# Patient Record
Sex: FEMALE | Race: WHITE | NOT HISPANIC OR LATINO | Employment: FULL TIME | URBAN - METROPOLITAN AREA
[De-identification: names, ages, dates, MRNs, and addresses within clinical notes are randomized per-mention and may not be internally consistent; named-entity substitution may affect disease eponyms.]

---
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
## 2020-06-24 ENCOUNTER — HOSPITAL ENCOUNTER (EMERGENCY)
Facility: HOSPITAL | Age: 20
Discharge: HOME | End: 2020-06-24
Attending: EMERGENCY MEDICINE
Payer: MEDICAID

## 2020-06-24 VITALS
SYSTOLIC BLOOD PRESSURE: 110 MMHG | DIASTOLIC BLOOD PRESSURE: 71 MMHG | HEART RATE: 98 BPM | RESPIRATION RATE: 18 BRPM | BODY MASS INDEX: 21.34 KG/M2 | TEMPERATURE: 100 F | OXYGEN SATURATION: 99 % | WEIGHT: 125 LBS | HEIGHT: 64 IN

## 2020-06-24 DIAGNOSIS — N12 PYELONEPHRITIS: Primary | ICD-10-CM

## 2020-06-24 LAB
ALBUMIN SERPL BCP-MCNC: 4.8 G/DL (ref 3.5–5.2)
ALP SERPL-CCNC: 50 U/L (ref 55–135)
ALT SERPL W/O P-5'-P-CCNC: 14 U/L (ref 10–44)
ANION GAP SERPL CALC-SCNC: 10 MMOL/L (ref 8–16)
AST SERPL-CCNC: 18 U/L (ref 10–40)
B-HCG UR QL: NEGATIVE
BACTERIA #/AREA URNS HPF: ABNORMAL /HPF
BASOPHILS # BLD AUTO: 0.04 K/UL (ref 0–0.2)
BASOPHILS NFR BLD: 0.3 % (ref 0–1.9)
BILIRUB SERPL-MCNC: 1 MG/DL (ref 0.1–1)
BILIRUB UR QL STRIP: NEGATIVE
BILIRUB UR QL STRIP: NEGATIVE
BUN SERPL-MCNC: 9 MG/DL (ref 6–20)
CALCIUM SERPL-MCNC: 9.8 MG/DL (ref 8.7–10.5)
CHLORIDE SERPL-SCNC: 108 MMOL/L (ref 95–110)
CLARITY UR: CLEAR
CLARITY UR: CLEAR
CO2 SERPL-SCNC: 21 MMOL/L (ref 23–29)
COLOR UR: YELLOW
COLOR UR: YELLOW
CREAT SERPL-MCNC: 0.9 MG/DL (ref 0.5–1.4)
CTP QC/QA: YES
D DIMER PPP IA.FEU-MCNC: 0.45 UG/ML FEU
DIFFERENTIAL METHOD: ABNORMAL
EOSINOPHIL # BLD AUTO: 0 K/UL (ref 0–0.5)
EOSINOPHIL NFR BLD: 0.1 % (ref 0–8)
ERYTHROCYTE [DISTWIDTH] IN BLOOD BY AUTOMATED COUNT: 12.2 % (ref 11.5–14.5)
EST. GFR  (AFRICAN AMERICAN): >60 ML/MIN/1.73 M^2
EST. GFR  (NON AFRICAN AMERICAN): >60 ML/MIN/1.73 M^2
GLUCOSE SERPL-MCNC: 95 MG/DL (ref 70–110)
GLUCOSE UR QL STRIP: NEGATIVE
GLUCOSE UR QL STRIP: NEGATIVE
HCT VFR BLD AUTO: 44.1 % (ref 37–48.5)
HGB BLD-MCNC: 14.5 G/DL (ref 12–16)
HGB UR QL STRIP: ABNORMAL
HGB UR QL STRIP: ABNORMAL
HYALINE CASTS #/AREA URNS LPF: 22 /LPF
IMM GRANULOCYTES # BLD AUTO: 0.08 K/UL (ref 0–0.04)
IMM GRANULOCYTES NFR BLD AUTO: 0.5 % (ref 0–0.5)
KETONES UR QL STRIP: NEGATIVE
KETONES UR QL STRIP: NEGATIVE
LEUKOCYTE ESTERASE UR QL STRIP: ABNORMAL
LEUKOCYTE ESTERASE UR QL STRIP: ABNORMAL
LYMPHOCYTES # BLD AUTO: 0.9 K/UL (ref 1–4.8)
LYMPHOCYTES NFR BLD: 6.1 % (ref 18–48)
MCH RBC QN AUTO: 28.1 PG (ref 27–31)
MCHC RBC AUTO-ENTMCNC: 32.9 G/DL (ref 32–36)
MCV RBC AUTO: 86 FL (ref 82–98)
MICROSCOPIC COMMENT: ABNORMAL
MONOCYTES # BLD AUTO: 1.1 K/UL (ref 0.3–1)
MONOCYTES NFR BLD: 7 % (ref 4–15)
NEUTROPHILS # BLD AUTO: 13 K/UL (ref 1.8–7.7)
NEUTROPHILS NFR BLD: 86 % (ref 38–73)
NITRITE UR QL STRIP: NEGATIVE
NITRITE UR QL STRIP: NEGATIVE
NRBC BLD-RTO: 0 /100 WBC
PH UR STRIP: 6 [PH] (ref 5–8)
PH UR STRIP: 6 [PH] (ref 5–8)
PLATELET # BLD AUTO: 146 K/UL (ref 150–350)
PMV BLD AUTO: 13.1 FL (ref 9.2–12.9)
POTASSIUM SERPL-SCNC: 3.7 MMOL/L (ref 3.5–5.1)
PROT SERPL-MCNC: 8.4 G/DL (ref 6–8.4)
PROT UR QL STRIP: NEGATIVE
PROT UR QL STRIP: NEGATIVE
RBC # BLD AUTO: 5.16 M/UL (ref 4–5.4)
RBC #/AREA URNS HPF: 5 /HPF (ref 0–4)
SODIUM SERPL-SCNC: 139 MMOL/L (ref 136–145)
SP GR UR STRIP: 1.01 (ref 1–1.03)
SP GR UR STRIP: 1.01 (ref 1–1.03)
SQUAMOUS #/AREA URNS HPF: 0 /HPF
URN SPEC COLLECT METH UR: ABNORMAL
URN SPEC COLLECT METH UR: ABNORMAL
UROBILINOGEN UR STRIP-ACNC: NEGATIVE EU/DL
UROBILINOGEN UR STRIP-ACNC: NEGATIVE EU/DL
WBC # BLD AUTO: 15.15 K/UL (ref 3.9–12.7)
WBC #/AREA URNS HPF: 61 /HPF (ref 0–5)

## 2020-06-24 PROCEDURE — 81001 URINALYSIS AUTO W/SCOPE: CPT

## 2020-06-24 PROCEDURE — 81025 URINE PREGNANCY TEST: CPT | Performed by: EMERGENCY MEDICINE

## 2020-06-24 PROCEDURE — 96375 TX/PRO/DX INJ NEW DRUG ADDON: CPT

## 2020-06-24 PROCEDURE — 96365 THER/PROPH/DIAG IV INF INIT: CPT

## 2020-06-24 PROCEDURE — 96366 THER/PROPH/DIAG IV INF ADDON: CPT

## 2020-06-24 PROCEDURE — 63600175 PHARM REV CODE 636 W HCPCS: Performed by: EMERGENCY MEDICINE

## 2020-06-24 PROCEDURE — 87086 URINE CULTURE/COLONY COUNT: CPT

## 2020-06-24 PROCEDURE — 25000003 PHARM REV CODE 250: Performed by: EMERGENCY MEDICINE

## 2020-06-24 PROCEDURE — 85025 COMPLETE CBC W/AUTO DIFF WBC: CPT

## 2020-06-24 PROCEDURE — 80053 COMPREHEN METABOLIC PANEL: CPT

## 2020-06-24 PROCEDURE — 85379 FIBRIN DEGRADATION QUANT: CPT

## 2020-06-24 PROCEDURE — 36415 COLL VENOUS BLD VENIPUNCTURE: CPT

## 2020-06-24 PROCEDURE — 99284 EMERGENCY DEPT VISIT MOD MDM: CPT | Mod: 25

## 2020-06-24 RX ORDER — ONDANSETRON 4 MG/1
4 TABLET, FILM COATED ORAL EVERY 6 HOURS
Qty: 12 TABLET | Refills: 0 | Status: SHIPPED | OUTPATIENT
Start: 2020-06-24

## 2020-06-24 RX ORDER — AMOXICILLIN AND CLAVULANATE POTASSIUM 875; 125 MG/1; MG/1
1 TABLET, FILM COATED ORAL 2 TIMES DAILY
Qty: 20 TABLET | Refills: 0 | Status: SHIPPED | OUTPATIENT
Start: 2020-06-24

## 2020-06-24 RX ORDER — KETOROLAC TROMETHAMINE 30 MG/ML
15 INJECTION, SOLUTION INTRAMUSCULAR; INTRAVENOUS
Status: COMPLETED | OUTPATIENT
Start: 2020-06-24 | End: 2020-06-24

## 2020-06-24 RX ADMIN — KETOROLAC TROMETHAMINE 15 MG: 30 INJECTION, SOLUTION INTRAMUSCULAR at 05:06

## 2020-06-24 RX ADMIN — SODIUM CHLORIDE 1000 ML: 0.9 INJECTION, SOLUTION INTRAVENOUS at 05:06

## 2020-06-24 RX ADMIN — CEFTRIAXONE 1 G: 1 INJECTION, SOLUTION INTRAVENOUS at 05:06

## 2020-06-24 NOTE — DISCHARGE INSTRUCTIONS
Take medications as directed  Follow up as directed  Return if he develops fever, vomiting, or you condition becomes worse

## 2020-06-24 NOTE — ED PROVIDER NOTES
Encounter Date: 6/24/2020       History     Chief Complaint   Patient presents with    Flank Pain     19-year-old well-appearing female presents to the emergency department with complaint of right flank pain she states the pain started approximately 1 week ago would come and go she reports that has been persistent for the last 2 days she was seen at a local urgent care prior to coming to the emergency department she was given 8 mg of Zofran and had 1 episode of vomiting after the Zofran patient denies any nausea or vomiting prior to the medication denies any associated fevers.        Review of patient's allergies indicates:  No Known Allergies  Past Medical History:   Diagnosis Date    Anemia      No past surgical history on file.  Family History   Problem Relation Age of Onset    Diabetes Mother     Hypertension Mother     No Known Problems Father      Social History     Tobacco Use    Smoking status: Never Smoker    Smokeless tobacco: Never Used   Substance Use Topics    Alcohol use: No     Frequency: Never    Drug use: Not on file     Review of Systems   Constitutional: Negative for fever.   HENT: Negative.    Respiratory: Negative.    Cardiovascular: Negative.    Gastrointestinal: Negative.    Genitourinary: Positive for flank pain. Negative for decreased urine volume, difficulty urinating, dyspareunia, dysuria, enuresis, frequency, genital sores, hematuria, menstrual problem, pelvic pain, urgency, vaginal bleeding and vaginal discharge.   Skin: Negative.    Neurological: Negative.    Hematological: Negative.    Psychiatric/Behavioral: Negative.    All other systems reviewed and are negative.      Physical Exam     Initial Vitals [06/24/20 1502]   BP Pulse Resp Temp SpO2   121/76 85 16 98.6 °F (37 °C) 99 %      MAP       --         Physical Exam    Vitals reviewed.  Constitutional: She appears well-developed and well-nourished.   HENT:   Head: Normocephalic.   Right Ear: External ear normal.   Left Ear:  External ear normal.   Nose: Nose normal.   Mouth/Throat: Oropharynx is clear and moist.   Eyes: Pupils are equal, round, and reactive to light.   Neck: Normal range of motion.   Cardiovascular: Normal rate, regular rhythm, normal heart sounds and intact distal pulses.   Pulmonary/Chest: Breath sounds normal.   Abdominal: Soft. Bowel sounds are normal.   Musculoskeletal: Normal range of motion.   Neurological: She is alert and oriented to person, place, and time. She has normal strength.   Skin: Skin is warm and dry.   Psychiatric: She has a normal mood and affect.         ED Course   Procedures  Labs Reviewed   URINALYSIS, REFLEX TO URINE CULTURE - Abnormal; Notable for the following components:       Result Value    Occult Blood UA 1+ (*)     Leukocytes, UA 2+ (*)     All other components within normal limits    Narrative:     Preferred Collection Type->Urine, Clean Catch  Specimen Source->Urine   CBC W/ AUTO DIFFERENTIAL - Abnormal; Notable for the following components:    WBC 15.15 (*)     Platelets 146 (*)     MPV 13.1 (*)     Gran # (ANC) 13.0 (*)     Immature Grans (Abs) 0.08 (*)     Lymph # 0.9 (*)     Mono # 1.1 (*)     Gran% 86.0 (*)     Lymph% 6.1 (*)     All other components within normal limits   COMPREHENSIVE METABOLIC PANEL - Abnormal; Notable for the following components:    CO2 21 (*)     Alkaline Phosphatase 50 (*)     All other components within normal limits   URINALYSIS, REFLEX TO URINE CULTURE - Abnormal; Notable for the following components:    Occult Blood UA 1+ (*)     Leukocytes, UA 2+ (*)     All other components within normal limits    Narrative:     Preferred Collection Type->Urine, Clean Catch  Specimen Source->Urine   URINALYSIS MICROSCOPIC - Abnormal; Notable for the following components:    RBC, UA 5 (*)     WBC, UA 61 (*)     Bacteria Many (*)     Hyaline Casts, UA 22 (*)     All other components within normal limits    Narrative:     Preferred Collection Type->Urine, Clean  Catch  Specimen Source->Urine   CULTURE, URINE   D DIMER, QUANTITATIVE   D DIMER, QUANTITATIVE   POCT URINE PREGNANCY          Imaging Results          CT Renal Stone Study ABD Pelvis WO (Final result)  Result time 06/24/20 15:44:35    Final result by Jay Lorenzo MD (06/24/20 15:44:35)                 Narrative:    CMS MANDATED QUALITY DATA - CT RADIATION  436    All CT scans at this facility utilize dose modulation, iterative  reconstruction, and/or weight based dosing when appropriate to reduce  radiation dose to as low as reasonably achievable.    CLINICAL HISTORY:  19 years (2000) Female Flank pain, kidney stone suspected RT flank  pain    TECHNIQUE:  CT RENAL STONE STUDY ABD PELVIS WO. 225 images obtained. Axial CT  images of the abdomen and pelvis were obtained from the dome of the  diaphragm to the proximal thigh.    CONTRAST:  No IV contrast was administered    COMPARISON:  None available.    FINDINGS:  Evaluation of the solid organs and vasculature is suboptimal without  contrast.    Lower Thorax:  The lung bases are clear. The heart is normal in size.    CT Abdomen:  Liver: The liver is normal in size and imaging appearance.  Gallbladder: The gallbladder is within normal limits.  Biliary Tree: No intra or extrahepatic ductal dilation.  Spleen: Within normal limits.  Pancreas: The pancreas is normal.  Adrenal Glands: The adrenal glands appear within normal limits.  Kidneys: No hydronephrosis, hydroureter or radiopaque renal/collecting  system stone. There is slight asymmetric perinephric fat stranding  around the right kidney.  Vasculature: The aorta is normal in course and caliber.  Lymph nodes: No abdominal lymphadenopathy is seen.  Intraperitoneal structures: There is no ascites.  Bowel: The partially filled bowel is within normal limits with a  nonobstructive bowel gas pattern. The appendix is normal (image 120)  Abdominal wall: The abdominal wall and musculature are  normal.  Musculoskeletal: No acute osseous abnormality is identified .    CT Pelvis:  Bladder: The urinary bladder is within normal limits.  Reproductive Organs: The uterus and ovaries are unremarkable.  Pelvic Lymph nodes: No pelvic lymphadenopathy or pelvic mass is  identified.    IMPRESSION:  No hydronephrosis, hydroureter or radiopaque renal/collecting system  stone. There is slight asymmetric perinephric fat stranding around the  right kidney, a nonspecific finding which may suggest a recently  passed stone, infection or physiologic variation.                    .    Electronically Signed by JACKI Carrington on 6/24/2020 3:50 PM                               Medical Decision Making:   Initial Assessment:   Right flank pain  Differential Diagnosis:   Ureterolithiasis, pyelonephritis, UTI, PE, cholecystitis  ED Management:  19-year-old female presents to the emergency department with complaint of right flank pain radiates around to her side.  She had 1 episode of vomiting prior to arrival she is currently eating a denies nausea.  Patient evaluated for presence of ureterolithiasis CT imaging reveals fat stranding consistent with either a previous passed stone or pyelonephritis.  Patient does have infected urine and a elevated white count consistent with pyelonephritis she was given IV Rocephin.  She will be discharged home with Augmentin and Zofran again she is tolerating by mouth.  Patient was given detailed return precautions. Dr Wharton personally evaluated patient                    ED Course as of Jun 24 1715   Wed Jun 24, 2020   1504 C/o right flank pain off and on for one week had on episode of n/v today. Went to urgent care sent here to exclude kidney stones     [MP]   1519 D dimer, quantitative [MP]   1522   Agree with plan and disposition and management.  I evaluated the patient myself along with the mid-level provider and examined the patient and agree with plan and management.    Face to face  exam  done on this patient.  Patient has right flank pain and will evaluate for kidney stones.  Patient had 8 mg of Zofran prior to arrival.  Pain control to be done once room opens up.  Currently treatment started in the waiting room.    [UM]      ED Course User Index  [MP] ISAMAR Choe  [UM] Shahana Wharton MD                Clinical Impression:       ICD-10-CM ICD-9-CM   1. Pyelonephritis  N12 590.80                                ISAMAR Choe  06/24/20 1724

## 2020-06-26 LAB
BACTERIA UR CULT: NORMAL
BACTERIA UR CULT: NORMAL

## 2021-04-08 ENCOUNTER — LAB VISIT (OUTPATIENT)
Dept: LAB | Facility: HOSPITAL | Age: 21
End: 2021-04-08
Attending: NURSE PRACTITIONER
Payer: MEDICAID

## 2021-04-08 DIAGNOSIS — E16.2 HYPOGLYCEMIA, UNSPECIFIED: ICD-10-CM

## 2021-04-08 DIAGNOSIS — E55.9 AVITAMINOSIS D: ICD-10-CM

## 2021-04-08 DIAGNOSIS — R53.83 FATIGUE: Primary | ICD-10-CM

## 2021-04-08 LAB
25(OH)D3+25(OH)D2 SERPL-MCNC: 22 NG/ML (ref 30–96)
ALBUMIN SERPL BCP-MCNC: 4.5 G/DL (ref 3.5–5.2)
ALP SERPL-CCNC: 49 U/L (ref 55–135)
ALT SERPL W/O P-5'-P-CCNC: 23 U/L (ref 10–44)
ANION GAP SERPL CALC-SCNC: 9 MMOL/L (ref 8–16)
AST SERPL-CCNC: 21 U/L (ref 10–40)
B-OH-BUTYR BLD STRIP-SCNC: 0.2 MMOL/L (ref 0–0.5)
BASOPHILS # BLD AUTO: 0.05 K/UL (ref 0–0.2)
BASOPHILS NFR BLD: 0.8 % (ref 0–1.9)
BILIRUB SERPL-MCNC: 0.9 MG/DL (ref 0.1–1)
BUN SERPL-MCNC: 10 MG/DL (ref 6–20)
CALCIUM SERPL-MCNC: 9.6 MG/DL (ref 8.7–10.5)
CHLORIDE SERPL-SCNC: 107 MMOL/L (ref 95–110)
CO2 SERPL-SCNC: 23 MMOL/L (ref 23–29)
CORTIS SERPL-MCNC: 10.8 UG/DL
CREAT SERPL-MCNC: 0.8 MG/DL (ref 0.5–1.4)
DIFFERENTIAL METHOD: ABNORMAL
EOSINOPHIL # BLD AUTO: 0.1 K/UL (ref 0–0.5)
EOSINOPHIL NFR BLD: 1.3 % (ref 0–8)
ERYTHROCYTE [DISTWIDTH] IN BLOOD BY AUTOMATED COUNT: 12.7 % (ref 11.5–14.5)
EST. GFR  (AFRICAN AMERICAN): >60 ML/MIN/1.73 M^2
EST. GFR  (NON AFRICAN AMERICAN): >60 ML/MIN/1.73 M^2
ESTIMATED AVG GLUCOSE: 105 MG/DL (ref 68–131)
FOLATE SERPL-MCNC: 10.1 NG/ML (ref 4–24)
GLUCOSE SERPL-MCNC: 89 MG/DL (ref 70–110)
HBA1C MFR BLD: 5.3 % (ref 4.5–6.2)
HCT VFR BLD AUTO: 43.4 % (ref 37–48.5)
HGB BLD-MCNC: 14.6 G/DL (ref 12–16)
IMM GRANULOCYTES # BLD AUTO: 0.02 K/UL (ref 0–0.04)
IMM GRANULOCYTES NFR BLD AUTO: 0.3 % (ref 0–0.5)
IRON SERPL-MCNC: 84 UG/DL (ref 30–160)
LYMPHOCYTES # BLD AUTO: 1.8 K/UL (ref 1–4.8)
LYMPHOCYTES NFR BLD: 28.2 % (ref 18–48)
MCH RBC QN AUTO: 28.2 PG (ref 27–31)
MCHC RBC AUTO-ENTMCNC: 33.6 G/DL (ref 32–36)
MCV RBC AUTO: 84 FL (ref 82–98)
MONOCYTES # BLD AUTO: 0.5 K/UL (ref 0.3–1)
MONOCYTES NFR BLD: 7.6 % (ref 4–15)
NEUTROPHILS # BLD AUTO: 3.8 K/UL (ref 1.8–7.7)
NEUTROPHILS NFR BLD: 61.8 % (ref 38–73)
NRBC BLD-RTO: 0 /100 WBC
PLATELET # BLD AUTO: 151 K/UL (ref 150–450)
PMV BLD AUTO: 13 FL (ref 9.2–12.9)
POTASSIUM SERPL-SCNC: 3.9 MMOL/L (ref 3.5–5.1)
PROT SERPL-MCNC: 7.9 G/DL (ref 6–8.4)
RBC # BLD AUTO: 5.18 M/UL (ref 4–5.4)
SATURATED IRON: 23 % (ref 20–50)
SODIUM SERPL-SCNC: 139 MMOL/L (ref 136–145)
T4 FREE SERPL-MCNC: 0.85 NG/DL (ref 0.71–1.51)
TOTAL IRON BINDING CAPACITY: 360 UG/DL (ref 250–450)
TRANSFERRIN SERPL-MCNC: 257 MG/DL (ref 200–375)
TSH SERPL DL<=0.005 MIU/L-ACNC: 2.69 UIU/ML (ref 0.34–5.6)
VIT B12 SERPL-MCNC: 240 PG/ML (ref 210–950)
WBC # BLD AUTO: 6.21 K/UL (ref 3.9–12.7)

## 2021-04-08 PROCEDURE — 82746 ASSAY OF FOLIC ACID SERUM: CPT | Performed by: NURSE PRACTITIONER

## 2021-04-08 PROCEDURE — 82533 TOTAL CORTISOL: CPT | Performed by: NURSE PRACTITIONER

## 2021-04-08 PROCEDURE — 84479 ASSAY OF THYROID (T3 OR T4): CPT | Performed by: NURSE PRACTITIONER

## 2021-04-08 PROCEDURE — 84439 ASSAY OF FREE THYROXINE: CPT | Performed by: NURSE PRACTITIONER

## 2021-04-08 PROCEDURE — 84445 ASSAY OF TSI GLOBULIN: CPT | Performed by: NURSE PRACTITIONER

## 2021-04-08 PROCEDURE — 81291 MTHFR GENE: CPT | Performed by: NURSE PRACTITIONER

## 2021-04-08 PROCEDURE — 82306 VITAMIN D 25 HYDROXY: CPT | Performed by: NURSE PRACTITIONER

## 2021-04-08 PROCEDURE — 83036 HEMOGLOBIN GLYCOSYLATED A1C: CPT | Performed by: NURSE PRACTITIONER

## 2021-04-08 PROCEDURE — 84207 ASSAY OF VITAMIN B-6: CPT | Performed by: NURSE PRACTITIONER

## 2021-04-08 PROCEDURE — 86376 MICROSOMAL ANTIBODY EACH: CPT | Performed by: NURSE PRACTITIONER

## 2021-04-08 PROCEDURE — 80053 COMPREHEN METABOLIC PANEL: CPT | Performed by: NURSE PRACTITIONER

## 2021-04-08 PROCEDURE — 84681 ASSAY OF C-PEPTIDE: CPT | Performed by: NURSE PRACTITIONER

## 2021-04-08 PROCEDURE — 83525 ASSAY OF INSULIN: CPT | Performed by: NURSE PRACTITIONER

## 2021-04-08 PROCEDURE — 82607 VITAMIN B-12: CPT | Performed by: NURSE PRACTITIONER

## 2021-04-08 PROCEDURE — 82010 KETONE BODYS QUAN: CPT | Performed by: NURSE PRACTITIONER

## 2021-04-08 PROCEDURE — 84436 ASSAY OF TOTAL THYROXINE: CPT | Mod: XB | Performed by: NURSE PRACTITIONER

## 2021-04-08 PROCEDURE — 36415 COLL VENOUS BLD VENIPUNCTURE: CPT | Performed by: NURSE PRACTITIONER

## 2021-04-08 PROCEDURE — 84443 ASSAY THYROID STIM HORMONE: CPT | Performed by: NURSE PRACTITIONER

## 2021-04-08 PROCEDURE — 85025 COMPLETE CBC W/AUTO DIFF WBC: CPT | Performed by: NURSE PRACTITIONER

## 2021-04-08 PROCEDURE — 83540 ASSAY OF IRON: CPT | Performed by: NURSE PRACTITIONER

## 2021-04-08 PROCEDURE — 82024 ASSAY OF ACTH: CPT | Performed by: NURSE PRACTITIONER

## 2021-04-08 PROCEDURE — 83090 ASSAY OF HOMOCYSTEINE: CPT | Performed by: NURSE PRACTITIONER

## 2021-04-09 LAB
C PEPTIDE SERPL-MCNC: 2.5 NG/ML (ref 1.1–4.4)
INSULIN SERPL-ACNC: 14.8 UIU/ML (ref 2.6–24.9)
T3RU NFR SERPL: 25 % (ref 24–39)
T4 SERPL-MCNC: 6.8 UG/DL (ref 4.5–12)
THYROPEROXIDASE AB SERPL-ACNC: <9 IU/ML (ref 0–34)

## 2021-04-10 LAB
ACTH PLAS-MCNC: 10.4 PG/ML (ref 7.2–63.3)
HCYS SERPL-SCNC: 13.1 UMOL/L (ref 0–14.5)
TSI SER-ACNC: <0.1 IU/L (ref 0–0.55)

## 2021-04-13 LAB — MTHFR GENE MUT ANL BLD/T: NORMAL

## 2021-04-17 LAB — VIT B6 SERPL-MCNC: 16.8 UG/L (ref 2–32.8)

## 2021-05-12 ENCOUNTER — PATIENT MESSAGE (OUTPATIENT)
Dept: RESEARCH | Facility: HOSPITAL | Age: 21
End: 2021-05-12

## 2025-04-07 NOTE — PROGRESS NOTES
ENDOCRINOLOGY NEW PATIENT VISIT: 04/08/2025    The patient location is: Personal Vehicle  The chief complaint leading to consultation is: Hypoglycemia concern    Visit type: audiovisual    Face to Face time with patient: 25 minutes  40 minutes of total time spent on the encounter, which includes face to face time and non-face to face time preparing to see the patient (eg, review of tests), Obtaining and/or reviewing separately obtained history, Documenting clinical information in the electronic or other health record, Independently interpreting results (not separately reported) and communicating results to the patient/family/caregiver, or Care coordination (not separately reported).     Each patient to whom he or she provides medical services by telemedicine is:  (1) informed of the relationship between the physician and patient and the respective role of any other health care provider with respect to management of the patient; and (2) notified that he or she may decline to receive medical services by telemedicine and may withdraw from such care at any time.      Subjective:      Patient ID: Huyen Thomas is a 24 y.o. female.    Chief Complaint:  Consult and Hypoglycemia    History of Present Illness  Huyen Thomas presents for evaluation of concerns of hypoglycemia.  Has had issues with low blood sugars for many years and had prior testing with PCP outside of [facility] records for review.  She recalls labs were not overly concerning but also that abdominal ultrasound did not show issues with her pancreas.  Has been monitoring with glucometer.  Has done some dietary adjustments to try and help.      Regarding Hypoglycemia:    - Symptoms of hypoglycemia first started:  First occurred 10 years ago with worsening overtime.    - Episode frequency: Has been about every day recently.    - Symptoms include: Nausea, shakiness, mental confusion (mild), sweaty  - Corrects with: Some sugar and then will have something with  "higher protein amount    - Pertinent factors:  No   Yes  [x]    []   Prior hypoglycemic episodes  [x]    []   Currently taking diabetes medications  []    [x]   Occur within 2-3 hours of consuming meal  [x]    []   Occur overnight (single episode overnight that she recalls)  [x]    []   Occur after prolonged fasting  [x]    []   Occur after physical exertion  []    [x]   Symptoms resolved after consuming carbohydrate    - Personal history of hepatic, renal or cardiac failure:   None  - History of adrenal insufficiency:  None  - History of gastric bypass surgery or fundoplication:  None  - Alcohol use:  None    - Family history of hypoglycemia:   Patient denies family history of hypoglycemia    - Never confirmed on serum glucose test  - Whipple triad: (1) Symptoms of hypoglycemia (2) Hypoglycemia (blood glucose level <50 mg/dL) (3) Relief of symptoms following ingestion of carbohydrate  - 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  BP Readings from Last 3 Encounters:   03/25/23 117/70   06/24/20 110/71   01/29/19 125/72     Wt Readings from Last 1 Encounters:   03/25/23 0737 69 kg (152 lb 1.9 oz)     There is no height or weight on file to calculate BMI.    Physical Exam  Constitutional:       Appearance: Normal appearance.   Neurological:      Mental Status: She is alert.   Psychiatric:         Mood and Affect: Mood normal.         Behavior: Behavior normal.        Lab Review:   Lab Results   Component Value Date    HGBA1C 5.3 04/08/2021     No results found for: "CHOL", "HDL", "LDLCALC", "TRIG", "CHOLHDL"  Lab Results   Component Value Date     04/08/2021    K 3.9 04/08/2021 "     04/08/2021    CO2 23 04/08/2021    GLU 89 04/08/2021    BUN 10 04/08/2021    CREATININE 0.8 04/08/2021    CALCIUM 9.6 04/08/2021    PROT 7.9 04/08/2021    ALBUMIN 4.5 04/08/2021    BILITOT 0.9 04/08/2021    ALKPHOS 49 (L) 04/08/2021    AST 21 04/08/2021    ALT 23 04/08/2021    ANIONGAP 9 04/08/2021    ESTGFRAFRICA >60.0 04/08/2021    EGFRNONAA >60.0 04/08/2021    TSH 2.690 04/08/2021     Vit D, 25-Hydroxy   Date Value Ref Range Status   04/08/2021 22 (L) 30 - 96 ng/mL Final     Comment:     Vitamin D deficiency.........<10 ng/mL                              Vitamin D insufficiency......10-29 ng/mL       Vitamin D sufficiency........> or equal to 30 ng/mL  Vitamin D toxicity............>100 ng/mL         Assessment and Plan     Hypoglycemia  Does not satisfy whipple's triad of documented low glucose, symptoms consistent with hypoglycemia and relief of symptoms after treatment.  Would need lab confirmed testing showing low glucose under 55.  No prior records available to review when this first was noted to be an issue in recent years when worked up in [place] as she recalls.  Reviewed options and will plan to have her self monitor glucose for now and per her request we can send a CGM to her pharmacy.  Ideally should only need to check glucose levels when symptoms presents.  Overall pattern is in the post-prandial time frame.  Will suggest dietary recs with focus on low glycemic index foods for now.  Can consider a mixed meal clinic based test in the future as well.      Plan:  - Monitor glucose overtime with glucometer (CGM as well is ok but confirm lows with finger stick checks)  - Note dietary items that are playing a role, likely high glycemic index foods  - Adjust diet to lower glycemic index and correct lows with mixture of carbs followed by protein/fat  - If persistent lows we can plan a clinic based mixed meal test  - If confirmed lows we can consider medicine such as acarbose and further imaging if  hypoglycemia is confirmed in periods of fasting to rule out insulinoma or other pathologic cause of low glucose      RTC in 6 months as needed      **Visit today included increased complexity associated with the care of the problems addressed and managing the longitudinal care of the patient due to the serious and/or complex managed problems      Jose Jeronimo DO     Disclaimer: This note has been generated using voice-recognition software. There may be typographical errors that have been missed during proof-reading.

## 2025-04-08 ENCOUNTER — OFFICE VISIT (OUTPATIENT)
Facility: CLINIC | Age: 25
End: 2025-04-08
Payer: COMMERCIAL

## 2025-04-08 DIAGNOSIS — E16.2 HYPOGLYCEMIA: Primary | ICD-10-CM

## 2025-04-08 RX ORDER — BLOOD-GLUCOSE SENSOR
1 EACH MISCELLANEOUS
Qty: 2 EACH | Refills: 11 | Status: SHIPPED | OUTPATIENT
Start: 2025-04-08 | End: 2026-04-08

## 2025-04-08 NOTE — ASSESSMENT & PLAN NOTE
Does not satisfy whipple's triad of documented low glucose, symptoms consistent with hypoglycemia and relief of symptoms after treatment.  Would need lab confirmed testing showing low glucose under 55.  No prior records available to review when this first was noted to be an issue in recent years when worked up in [place] as she recalls.  Reviewed options and will plan to have her self monitor glucose for now and per her request we can send a CGM to her pharmacy.  Ideally should only need to check glucose levels when symptoms presents.  Overall pattern is in the post-prandial time frame.  Will suggest dietary recs with focus on low glycemic index foods for now.  Can consider a mixed meal clinic based test in the future as well.      Plan:  - Monitor glucose overtime with glucometer (CGM as well is ok but confirm lows with finger stick checks)  - Note dietary items that are playing a role, likely high glycemic index foods  - Adjust diet to lower glycemic index and correct lows with mixture of carbs followed by protein/fat  - If persistent lows we can plan a clinic based mixed meal test  - If confirmed lows we can consider medicine such as acarbose and further imaging if hypoglycemia is confirmed in periods of fasting to rule out insulinoma or other pathologic cause of low glucose

## 2025-04-08 NOTE — PATIENT INSTRUCTIONS
For patients interested in tracking glucose levels, there are now over-the-counter continuous glucose monitors (CGMs) available without a prescription.     These include the Dexcom Stelo (www.stelo.com) and the Abbott Lingo (www.[email or web address].com). These devices provide ongoing glucose trend data and can be useful for individuals wanting to better understand how food, activity, and other factors affect their blood sugar levels.    Please note: CGM readings are helpful for trends but may occasionally be inaccurate. If a CGM reading seems unusually high or low, it should always be confirmed with a standard glucometer (fingerstick).      Understanding Hypoglycemia: What You Need to Know    If you've been referred to me for hypoglycemia (low blood sugar) or have concerns about occasional drops in your blood sugar, this document is for you. As an endocrinologist, I see many patients worried about low blood sugar outside of diabetes, and I'm here to provide clarity. Most of the time, there's no serious medical condition to worry about, and I'll explain why below. This guide will cover what hypoglycemia really is, what might cause occasional blood sugar dips (especially after meals), and when we actually need to investigate further. My goal is to help you feel informed and confident about your health.     What Is Hypoglycemia, and When Do We Investigate It?  True hypoglycemia means your blood sugar drops low enough to cause symptoms and we can measure it on a blood test. We don't usually start digging deeper unless we confirm that your blood sugar drops below 55 mg/dL (measured from a serum blood test, not just a fingerstick) and you're having symptoms at the same time. This is part of something called Whipple's triad, which doctors use to decide if low blood sugar is a real medical issue:     Symptoms of low blood sugar: Things like shakiness, sweating, confusion, or irritability.   A confirmed low blood sugar reading:  Below 55 mg/dL on a lab test when symptoms happen.   Symptoms go away when blood sugar rises: Usually after eating or drinking something with sugar.    If your blood sugar isn't dropping this low--or if you feel off but your sugar is normal--we usually don't need to do extensive testing. Many people feel symptoms they think are from low blood sugar, but the numbers don't always match up. Let's explore why that might happen, especially after meals.     Why Does My Blood Sugar Drop After Eating?  Some people notice shakiness, fatigue, or hunger an hour or two after eating--a phenomenon often called post-prandial hypoglycemia or reactive hypoglycemia. This is when your blood sugar dips after a meal, but it's rarely a sign of something dangerous. Here's what's usually going on:     Normal Body Response: After you eat, especially a meal high in simple carbs (like white bread, sugary drinks, or sweets), your body releases insulin to move sugar from your blood into your cells. Sometimes, insulin overshoots a bit, and your blood sugar dips below its starting point. This is a normal process for most people and doesn't usually go low enough to cause true hypoglycemia (below 55 mg/dL).     Diet Influence: Meals heavy in refined sugars or carbs without much protein, fat, or fiber can make this dip more noticeable. Your body processes simple carbs quickly, leading to a fast spike and then a quick drop in blood sugar.     Late Dumping Syndrome: If you've had gastric bypass surgery, you might experience something called late dumping syndrome. This happens when food moves too quickly through your stomach, causing a big insulin surge and a later drop in blood sugar. It's more common with high-carb meals.    The good news? For the vast majority of people, these dips don't require medical treatment or surgery--they're manageable with simple changes (more on that later).     Could This Be Something Serious Like an  Insulinoma?  Patients sometimes worry about rare conditions like an insulinoma, a tumor in the pancreas that pumps out too much insulin. Here's why this is extremely unlikely for most people:     Insulinomas typically cause low blood sugar when you're fasting (like overnight or between meals), not just after eating.     If we ever suspect an insulinoma, we'd need specific lab tests (like measuring insulin and C-peptide levels during a low blood sugar episode) to confirm it. Only then would we consider imaging like a CT scan or MRI.     Reactive hypoglycemia after meals is almost never linked to insulinomas--it's a different process entirely.    So, unless your blood sugar is dropping significantly when you haven't eaten for a while, and labs point to excess insulin, we don't need to go down this road.     Other Medical Conditions That Can Affect Blood Sugar  While reactive hypoglycemia from meals is usually benign, certain health conditions can make low blood sugar more likely. These are rare, but here's what we consider:     Alcohol: Drinking, especially on an empty stomach, can lower blood sugar by interfering with your liver's ability to release stored glucose.     Liver Disease: Your liver helps keep blood sugar stable. If it's not working well (e.g., from cirrhosis or hepatitis), low blood sugar can happen.     Kidney Disease: Kidneys help clear insulin from your body. If they're impaired, insulin can stick around longer, lowering blood sugar.     Heart Disease: Severe heart failure can stress your body and affect glucose regulation, though this is uncommon.     Adrenal Insufficiency: Your adrenal glands make hormones like cortisol that help maintain blood sugar. If they're not working (e.g., Goochland's disease), lows can occur.     Gastric Bypass Surgery: As mentioned, this can lead to late dumping syndrome, causing post-meal dips.    If you have one of these conditions, we'd tailor our approach. But for most  people referred to me, none of these apply, and no major workup is needed.     How Can I Manage Post-Meal Blood Sugar Dips?  If you're having symptoms after eating and think it's related to blood sugar, the best first step is dietary adjustment. Here's what works:     Eat balanced meals: Pair carbs with protein (like chicken or beans), healthy fats (like nuts or avocado), and fiber (like vegetables or whole grains). This slows digestion and steadies blood sugar.     Limit simple carbs: Cut back on sugary foods, white bread, and processed snacks that spike and crash your sugar.     Smaller, frequent meals: Instead of three big meals, try 4-5 smaller ones to keep your blood sugar even.     Avoid eating carbs alone: A piece of fruit is great, but pair it with peanut butter or cheese to avoid a quick drop.    For late dumping syndrome after gastric bypass, these changes are especially key. Most people feel better within weeks of tweaking their diet--no meds or surgery required.   More info further below about diet options and changes.    What If Diet Changes Aren't Enough?  If we're unsure what's happening, we might do a mixed meal test. You'd eat a meal with carbs, protein, and fat, and we'd check your blood sugar and symptoms over a few hours. This helps us see if you're truly having reactive hypoglycemia. If confirmed, and diet alone doesn't help, we have options:     Acarbose: A pill that slows carb digestion, reducing insulin spikes.     Octreotide: An injection that lowers insulin release, used in rare cases.     Diazoxide: A medication to prevent low blood sugar, also rare.     GLP-1 Receptor Agonists (off-label): Typically used for diabetes, these can sometimes help stabilize blood sugar in select cases.    These are backup plans--most people don't need them. Surgery is almost never part of the picture for reactive hypoglycemia.     The Bottom Line  For the vast majority of people I see, occasional blood sugar  dips after meals are not a medical emergency. They're usually a sign your body is reacting to what you ate, not a disease. We only dig deeper if your blood sugar drops below 55 mg/dL with symptoms and labs suggest something unusual. My focus is helping you feel better with practical steps, not jumping to tests or treatments you don't need.       Common Questions and Answers  Here are some questions patients often ask me about hypoglycemia:     Q: Why do I feel shaky or tired after eating if my blood sugar isn't that low?  A: Your body might be reacting to a quick drop in blood sugar after a spike, even if it doesn't hit hypoglycemia levels. Other things like dehydration, caffeine, or stress can mimic these symptoms too. Try tweaking your diet and see if it helps.     Q: Should I check my blood sugar at home with a glucometer?  A: Fingerstick glucometers aren't always accurate for diagnosing true hypoglycemia--they're better for diabetes management. If you're worried, we'd need a lab blood test during symptoms to get the real picture.     Q: Could this be a tumor or something serious?  A: It's very unlikely. Tumors like insulinomas cause fasting lows, not just post-meal dips, and we'd see specific lab clues. If I suspect this (which is rare), I'll order the right tests.     Q: How long should I try diet changes before I ask for more help?  A: Give it 2-4 weeks. If you're still struggling, let me know--we can discuss next steps like a mixed meal test or meds.     Q: Can stress or lack of sleep make this worse?  A: Yes! Stress hormones and poor sleep can mess with how your body handles sugar, making dips feel more noticeable. Managing those might help too.     Q: Will I need surgery?  A: Almost certainly not. Surgery is only for rare cases like insulinomas, which we'd confirm first with labs and imaging. Reactive hypoglycemia is managed with diet or meds, not scalpels.       10-Point Nutrition Plan for Preventing  Hypoglycemia in Post-Bariatric Hypoglycemia.    Control portions of carbohydrate - ideally 30 grams/meal, 15 grams/snack.  Choose low-glycemic carbohydrates.  Avoid high-glycemic carbohydrates.  Include (heart-healthy) fats in each meal or snack - 15 grams/meal, 5 grams/snack.  Emphasize optimal protein intake, ideally 60 to 120 grams of protein a day or more.  Space meals/snacks 3-4 hours apart.  Avoid (for 30 mins or more) or limit amount of liquids consumed with meals (to help prevent rapid delivery of carbs to the intestine).  Avoid alcohol.  Avoid caffeine (best beverages are water or non-carbonated low calorie options).  Maintain post-bariatric vitamin and mineral intake.      The Glycemic Index (GI) measures the impact of carbohydrates on your blood sugar level. The GI relates to how quickly certain foods cause your blood sugar to rise after you eat them. Foods with higher GIs can cause rapid spikes in blood glucose while those with lower GIs have less of an effect       Low Glycemic Index Carbohydrates (CHOOSE)    Steel-cut oats (regular, not quick-cook or instant)  Oat bran cereal  Beans/legumes (e.g., garbanzo, navy, kidney, lima, de paz, black-eyed and pea beans, edamame (soybeans), lentils  Bean products (e.g., hummus, tofu)  Pearled barley, cooked al dente  Yams  Some fruits (e.g., grapefruit, apples, pears, berries, apricots, peaches)  Some pasta (e.g., Barilla Plus pasta), cooked al dente  Some whole grain breads (e.g., Stefano bread, Geos Flax, Oat Bran & Whole Wheat Silvia/Lavash/Tortillas)  Some whole grain crackers (e.g., RyKrisp, RyVita, Wasa)      High Glycemic Index Carbohydrates (AVOID)    Refined breakfast cereals (e.g., Corn Flakes, Rice Krispies, Cream of Rice, instant oatmeal)  Regular pasta  Most starchy vegetables (e.g., white potatoes, corn, winter (orange) squash)  White rice, rice cakes  Popcorn, pretzels, chips  Some fruits (e.g., ripe bananas, pineapple, trish, watermelon,  grapes)  All fruit juices and sweetened drinks (e.g., sodas, sweetened iced tea)  Bread, rolls, bagels, English muffins, and crackers made with refined flour  Sweets (e.g., candy, cake, cookies, ice cream, syrup)      Heart-Healthy Fats    Nuts, nut butters  Avocado, guacamole  Olives  Most plant oils (e.g., olive, canola, peanut, soy, sunflower, sesame)  Most seeds (e.g., sunflower, flax, sesame/sesame tahini)  Oily fish (e.g., salmon, bluefish, mackerel, tuna, sardines)      You can find more information online about foods to choose with low glycemic indexes and ones to avoid with high glycemic index.

## 2025-04-23 PROBLEM — F41.1 GAD (GENERALIZED ANXIETY DISORDER): Status: ACTIVE | Noted: 2025-04-23

## 2025-04-29 ENCOUNTER — RESULTS FOLLOW-UP (OUTPATIENT)
Dept: OBSTETRICS AND GYNECOLOGY | Facility: CLINIC | Age: 25
End: 2025-04-29

## 2025-04-29 ENCOUNTER — OFFICE VISIT (OUTPATIENT)
Dept: OBSTETRICS AND GYNECOLOGY | Facility: CLINIC | Age: 25
End: 2025-04-29
Payer: COMMERCIAL

## 2025-04-29 VITALS — SYSTOLIC BLOOD PRESSURE: 126 MMHG | WEIGHT: 149.5 LBS | BODY MASS INDEX: 25.66 KG/M2 | DIASTOLIC BLOOD PRESSURE: 70 MMHG

## 2025-04-29 DIAGNOSIS — G43.109 MIGRAINE WITH AURA AND WITHOUT STATUS MIGRAINOSUS, NOT INTRACTABLE: ICD-10-CM

## 2025-04-29 DIAGNOSIS — Z12.4 CERVICAL CANCER SCREENING: ICD-10-CM

## 2025-04-29 DIAGNOSIS — N93.9 ABNORMAL UTERINE BLEEDING (AUB): Primary | ICD-10-CM

## 2025-04-29 DIAGNOSIS — Z30.2 ENCOUNTER FOR STERILIZATION: ICD-10-CM

## 2025-04-29 DIAGNOSIS — N94.3 PMS (PREMENSTRUAL SYNDROME): ICD-10-CM

## 2025-04-29 DIAGNOSIS — N94.6 DYSMENORRHEA: ICD-10-CM

## 2025-04-29 LAB
B-HCG UR QL: NEGATIVE
CTP QC/QA: YES

## 2025-04-29 PROCEDURE — 99999 PR PBB SHADOW E&M-EST. PATIENT-LVL III: CPT | Mod: PBBFAC,,, | Performed by: OBSTETRICS & GYNECOLOGY

## 2025-04-29 NOTE — PROGRESS NOTES
Care Transitions ED Outreach Telephone Call Attempt    Discharge Date: 9/23/22  Discharge Location: [facility] Hospital: [facility]    Call Attempt Date: 9/25/2022  Call Attempt: Second   Chief Complaint   Patient presents with    Sterilization       History of Present Illness   24 y.o. F patient presents today for abnormal uterine bleeding & dysmenorrhea. She does not plan for childbearing, desires sterilization.     Abnormal uterine bleeding/Dysmenorrhea:   Menarche 11  Cycles monthly, lasting 4-7 days, heavy day 2-3, using about 15-20 tpp, & cramping  Dysmenorrhea: moderate to severe  Current Contraception: none  Prior Contraception:   yes, Depo Provera for 5 years started due to abnormal uterine bleeding and contraception, irregular bleeding for the 1st year, then no cycle for 4 years, stopped due to not needing contraception and didn't want to be on Depo Provera long term.   She has migraines with aura, avoid estrogen containing contraception.   Sexually Active: yes  No LMP recorded. (Menstrual status: Birth Control).     PMS:   Complains of 1-2 wk before her cycle irritable, depressed, decreased appetite, decreased interest. Thinks she has PMDD, started zoloft last week. Admits to actual depression.     Work up:  Labs:   Hemoglobin (g/dL)   Date Value   04/23/2025 14.3     Hematocrit (%)   Date Value   04/23/2025 43.4     TSH (uIU/mL)   Date Value   04/23/2025 1.010       Past medical and surgical history reviewed.   I have reviewed the patient's medical history in detail and updated the computerized patient record.  Review of patient's allergies indicates:  No Known Allergies    Review of Systems  Constitutional: negative for chills and fatigue  Gastrointestinal: negative for abdominal pain, constipation, diarrhea, nausea and vomiting  Genitourinary:negative for abnormal menstrual periods, genital lesions and vaginal discharge, dysuria, frequency and hematuria    Physical Examination:  /70 (Patient Position: Sitting)   Wt 67.8 kg (149 lb 7.6 oz)   BMI 25.66 kg/m²    Physical Exam:   Constitutional: She appears well-developed and well-nourished. No distress.             Abdominal: Soft.  There is no abdominal tenderness.     Genitourinary:    Inguinal canal, vagina, uterus, right adnexa and left adnexa normal.   The external female genitalia was normal.     Labial bartholins normal.Cervix is normal.                     Assessment/Plan:  Abnormal uterine bleeding (AUB)  -     US Pelvis Comp with Transvag NON-OB (xpd; Future; Expected date: 04/29/2025  -     POCT Urine Pregnancy    Encounter for sterilization    Dysmenorrhea  -     US Pelvis Comp with Transvag NON-OB (xpd; Future; Expected date: 04/29/2025  -     POCT Urine Pregnancy    Cervical cancer screening    PMS (premenstrual syndrome)    Migraine with aura and without status migrainosus, not intractable    Abnormal uterine bleeding: Work up ordered. Discussed progestin only options for abnormal uterine bleeding. Avoid estrogen due to migraines with aura. Offered pill, shot, & IUD. She declined. She is interested in surgical options for sterilization and treatment for abnormal uterine bleeding. Discussed ablation/salpingectomy vs hysterectomy with ovarian preservation. She is considering her options.     Dysmenorrhea: Start NSAIDS for pain. Ibuprofen 800mg initally for severe pain. Then 400-800mg Ibuprofen every eight hours as needed. Start medication two days before your cycle and for the first few days of your cycle.    Dicussed possible PMDD vs depression. Recommended tracking symptoms and cycles. Recommended continuing SSRI. Avoid estrogen due to migraines with aura.     I spent a total of 30 minutes on the day of the visit.This includes face to face time and non-face to face time preparing to see the patient (eg, review of tests), obtaining and/or reviewing separately obtained history, documenting clinical information in the electronic or other health record, independently interpreting results and communicating results to the patient/family/caregiver, or care coordinator.

## 2025-04-29 NOTE — Clinical Note
Please schedule:  Procedure: laparoscopic bilateral salpingectomy  Dx: encounter for sterilization  CPT:  Inpatient vs Outpatient: outpatient Surgery Location: [facility] Assistant: any FA Book on:  Preferred [facility] 6/6, 7/18 ST 6/13, 6/27, 7/11, 7/25

## 2025-04-30 ENCOUNTER — PATIENT MESSAGE (OUTPATIENT)
Dept: OBSTETRICS AND GYNECOLOGY | Facility: CLINIC | Age: 25
End: 2025-04-30
Payer: COMMERCIAL

## 2025-05-01 DIAGNOSIS — N93.9 ABNORMAL UTERINE BLEEDING (AUB): Primary | ICD-10-CM

## 2025-05-01 DIAGNOSIS — N94.6 DYSMENORRHEA: ICD-10-CM

## 2025-05-02 ENCOUNTER — HOSPITAL ENCOUNTER (OUTPATIENT)
Dept: RADIOLOGY | Facility: HOSPITAL | Age: 25
Discharge: HOME | End: 2025-05-02
Attending: OBSTETRICS & GYNECOLOGY
Payer: COMMERCIAL

## 2025-05-02 DIAGNOSIS — N94.6 DYSMENORRHEA: ICD-10-CM

## 2025-05-02 DIAGNOSIS — N93.9 ABNORMAL UTERINE BLEEDING (AUB): ICD-10-CM

## 2025-05-02 PROCEDURE — 76856 US EXAM PELVIC COMPLETE: CPT | Mod: TC,PN

## 2025-05-02 PROCEDURE — 76830 TRANSVAGINAL US NON-OB: CPT | Mod: 26,,, | Performed by: RADIOLOGY

## 2025-05-02 PROCEDURE — 76856 US EXAM PELVIC COMPLETE: CPT | Mod: 26,,, | Performed by: RADIOLOGY

## 2025-06-05 ENCOUNTER — PATIENT MESSAGE (OUTPATIENT)
Dept: OBSTETRICS AND GYNECOLOGY | Facility: CLINIC | Age: 25
End: 2025-06-05
Payer: COMMERCIAL

## 2025-06-23 ENCOUNTER — OFFICE VISIT (OUTPATIENT)
Dept: OBSTETRICS AND GYNECOLOGY | Facility: CLINIC | Age: 25
End: 2025-06-23
Payer: COMMERCIAL

## 2025-06-23 VITALS — WEIGHT: 151 LBS | SYSTOLIC BLOOD PRESSURE: 112 MMHG | DIASTOLIC BLOOD PRESSURE: 70 MMHG | BODY MASS INDEX: 25.92 KG/M2

## 2025-06-23 DIAGNOSIS — N94.6 DYSMENORRHEA: ICD-10-CM

## 2025-06-23 DIAGNOSIS — N93.9 ABNORMAL UTERINE BLEEDING (AUB): Primary | ICD-10-CM

## 2025-06-23 PROCEDURE — 99499 UNLISTED E&M SERVICE: CPT | Mod: S$GLB,,, | Performed by: OBSTETRICS & GYNECOLOGY

## 2025-06-23 PROCEDURE — 99999 PR PBB SHADOW E&M-EST. PATIENT-LVL III: CPT | Mod: PBBFAC,,, | Performed by: OBSTETRICS & GYNECOLOGY

## 2025-06-23 RX ORDER — CEFAZOLIN SODIUM 2 G/50ML
2 SOLUTION INTRAVENOUS
Status: CANCELLED | OUTPATIENT
Start: 2025-06-23

## 2025-06-23 RX ORDER — SODIUM CHLORIDE, SODIUM LACTATE, POTASSIUM CHLORIDE, CALCIUM CHLORIDE 600; 310; 30; 20 MG/100ML; MG/100ML; MG/100ML; MG/100ML
INJECTION, SOLUTION INTRAVENOUS CONTINUOUS
Status: CANCELLED | OUTPATIENT
Start: 2025-06-23

## 2025-06-23 NOTE — PROGRESS NOTES
24 y.o.  presents for pre-op H&P for robotic assisted laparoscopic hysterectomy/ bilateral salpingectomy / cystoscopy  for dysmenorrhea and abnormal uterine bleeding.  No LMP recorded. (Menstrual status: Birth Control)..      Past Medical History:   Diagnosis Date    Anemia     Anxiety disorder, unspecified     Depression     Hypoglycemia, unspecified     Migraine with aura, with intractable migraine, so stated, with status migrainosus      History reviewed. No pertinent surgical history.  Family History   Problem Relation Name Age of Onset    No Known Problems Father      Diabetes Mother Mother     Hypertension Mother Mother     Breast cancer Neg Hx      Colon cancer Neg Hx      Uterine cancer Neg Hx      Ovarian cancer Neg Hx       Review of patient's allergies indicates:  No Known Allergies  Current Medications[1]  Social History[2]    ROS:   General: denies fatigue  Cardiovascular: denies dyspnea, orthopnea    Vitals:    25 1311   BP: 112/70     General Appearance: Alert, appropriate appearance for age. No acute distress,appropriate affect.   Chest/Respiratory: normal, CTA  Cardiovascular: RRR  Abdl: soft, NT/ND  Pelvic Exam Female: Exam deferred.     Assessment: dysmenorrhea and abnormal uterine bleeding  Plan: robotic assisted laparoscopic hysterectomy, bilateral salpingectomy, cystoscopy   I have discussed the risks, benefits, indications, and alternatives of the procedure in detail.  The patient verbalizes her understanding.  All questions answered.  Consents signed.  The patient agrees to proceed to proceed as planned.       [1]   Current Outpatient Medications:     hydrOXYzine HCL (ATARAX) 10 MG Tab, Take 25 mg by mouth 3 (three) times daily as needed., Disp: , Rfl:     ondansetron (ZOFRAN) 4 MG tablet, Take 1 tablet (4 mg total) by mouth every 6 (six) hours as needed for Nausea., Disp: 20 tablet, Rfl: 0    sertraline (ZOLOFT) 50 MG tablet, Take 1 tablet (50 mg total) by mouth once daily.,  Disp: 30 tablet, Rfl: 0    sumatriptan (IMITREX) 50 MG tablet, Take 1-2 tablets as needed for migraine. May repeat dose in 2 hours if no improvement.  Take no more than 4 tablets in 1 day, Disp: 30 tablet, Rfl: 0  [2]   Social History  Socioeconomic History    Marital status: Single     Spouse name: Willian    Number of children: 0   Occupational History    Occupation: [employer]   Tobacco Use    Smoking status: Never    Smokeless tobacco: Never   Substance and Sexual Activity    Alcohol use: No    Drug use: Not Currently    Sexual activity: Yes     Partners: Male     Social Drivers of Health     Financial Resource Strain: High Risk (4/4/2025)    Overall Financial Resource Strain (CARDIA)     Difficulty of Paying Living Expenses: Hard   Food Insecurity: No Food Insecurity (4/4/2025)    Hunger Vital Sign     Worried About Running Out of Food in the Last Year: Never true     Ran Out of Food in the Last Year: Never true   Transportation Needs: No Transportation Needs (4/4/2025)    PRAPARE - Transportation     Lack of Transportation (Medical): No     Lack of Transportation (Non-Medical): No   Physical Activity: Unknown (4/4/2025)    Exercise Vital Sign     Days of Exercise per Week: 5 days     Minutes of Exercise per Session: Patient declined   Stress: Stress Concern Present (4/4/2025)    [employer] [place] [employer] - Occupational Stress Questionnaire     Feeling of Stress : To some extent   Housing Stability: Low Risk  (4/4/2025)    Housing Stability Vital Sign     Unable to Pay for Housing in the Last Year: No     Number of Times Moved in the Last Year: 1     Homeless in the Last Year: No

## 2025-06-24 ENCOUNTER — RESULTS FOLLOW-UP (OUTPATIENT)
Dept: OBSTETRICS AND GYNECOLOGY | Facility: CLINIC | Age: 25
End: 2025-06-24

## 2025-06-27 PROBLEM — Z90.710 STATUS POST LAPAROSCOPIC HYSTERECTOMY: Status: ACTIVE | Noted: 2025-06-27

## 2025-07-01 ENCOUNTER — PATIENT MESSAGE (OUTPATIENT)
Dept: OBSTETRICS AND GYNECOLOGY | Facility: CLINIC | Age: 25
End: 2025-07-01
Payer: COMMERCIAL

## 2025-07-01 ENCOUNTER — TELEPHONE (OUTPATIENT)
Dept: OBSTETRICS AND GYNECOLOGY | Facility: CLINIC | Age: 25
End: 2025-07-01
Payer: COMMERCIAL

## 2025-07-01 NOTE — TELEPHONE ENCOUNTER
Copied from CRM #[id number]. Topic: Appointments - Appointment Access  >> Jun 30, 2025 11:33 AM Kiara wrote:  Type:  Sooner Appointment Request    Caller is requesting a sooner appointment.  Caller declined first available appointment listed below.  Caller will not accept being placed on the waitlist and is requesting a message be sent to doctor.  Name of Caller:Pt  When is the first available appointment?Aug  Symptoms: 2 weeks post opp follow up  Would the patient rather a call back or a response via [employer]? Call  Best Call Back Number:[phone]   Additional Information:   Pt needs to schedule 2 week post op follow up 6 weeks is scheduled

## 2025-07-09 ENCOUNTER — TELEPHONE (OUTPATIENT)
Dept: OBSTETRICS AND GYNECOLOGY | Facility: CLINIC | Age: 25
End: 2025-07-09
Payer: COMMERCIAL

## 2025-07-09 NOTE — TELEPHONE ENCOUNTER
Copied from CRM #[id number]. Topic: General Inquiry - Patient Advice  >> Jul 9, 2025 12:00 PM Oralia wrote:  Type: Needs Medical Advice  Who Called:  [employer] [facility]    Best Call Back Number: [phone]  Additional Information: Calling to confirm the patient surgery date, please advise

## 2025-07-09 NOTE — TELEPHONE ENCOUNTER
Called and spoke with [facility]  rep to confirm surgery date. Rep understood and had no further complaints.

## 2025-07-14 ENCOUNTER — OFFICE VISIT (OUTPATIENT)
Dept: OBSTETRICS AND GYNECOLOGY | Facility: CLINIC | Age: 25
End: 2025-07-14
Payer: COMMERCIAL

## 2025-07-14 VITALS
SYSTOLIC BLOOD PRESSURE: 120 MMHG | WEIGHT: 148.81 LBS | BODY MASS INDEX: 25.41 KG/M2 | DIASTOLIC BLOOD PRESSURE: 78 MMHG | HEIGHT: 64 IN

## 2025-07-14 DIAGNOSIS — Z90.710 STATUS POST LAPAROSCOPIC HYSTERECTOMY: ICD-10-CM

## 2025-07-14 DIAGNOSIS — Z09 POSTOPERATIVE EXAMINATION: Primary | ICD-10-CM

## 2025-07-14 PROCEDURE — 99999 PR PBB SHADOW E&M-EST. PATIENT-LVL III: CPT | Mod: PBBFAC,,, | Performed by: OBSTETRICS & GYNECOLOGY

## 2025-07-14 PROCEDURE — 99024 POSTOP FOLLOW-UP VISIT: CPT | Mod: S$GLB,,, | Performed by: OBSTETRICS & GYNECOLOGY

## 2025-07-14 NOTE — PROGRESS NOTES
"Chief Complaint   Patient presents with    Post-op Evaluation       History of Present Illness:  Pateint presents today 2 weeks postop, status post robotic assisted laparoscopic hysterectomy, bilateral salpingectomy, cystoscopy     She is doing well this morning. She is tolerating a regular diet without nausea or vomiting. She is voiding spontaneously. She is ambulating. She has passed flatus, and has a BM. Vaginal bleeding is none. She denies fever or chills. Abdominal pain is mild and controlled with oral medications.     Surgery:   XI ROBOTIC HYSTERECTOMY,WITH SALPINGECTOMY (Bilateral)  CYSTOSCOPY (N/A)  Ovarian preservation    Operative Findings:   Grossly normal cervix, uterus, tubes, ovaries enlarged with multiple follicles,   Several small 3mm dark lesion on peritoneal surface, two on bladder reflection, two in post cul de sac, suspected endometriosis, all fulgurated, stage I.        Pathology:   UTERUS 89g, CERVIX, AND FALLOPIAN TUBES; HYSTERECTOMY AND BILATERAL    SALPINGECTOMY:      MYOMETRIUM:  --NO SIGNIFICANT HISTOPATHOLOGIC ABNORMALITY.    ENDOMETRIUM:  --PROLIFERATIVE-PHASE ENDOMETRIUM.      CERVIX:  --FOCAL CHRONIC CERVICITIS.  --FOCAL SQUAMOUS METAPLASIA OF ENDOCERVIX.      LEFT FALLOPIAN TUBE: --FOCAL SEROSAL FIBROVASCULAR ADHESIONS.      RIGHT FALLOPIAN TUBE: --NO SIGNIFICANT HISTOPATHOLOGIC ABNORMALITY     Physical exam:  Vital Signs:   Vitals:    07/14/25 0821   BP: 120/78   Weight: 67.5 kg (148 lb 13 oz)   Height: 5' 4" (1.626 m)     Abdominal: Soft. She exhibits no distension and no mass.  The incisions are healing well. There is no rebound and no guarding.   Genitourinary: Deferred    Assessment/Plan:   Postoperative examination    Status post laparoscopic hysterectomy      Follow up  4 weeks  Continue pelvic rest.       "

## 2025-08-07 ENCOUNTER — OFFICE VISIT (OUTPATIENT)
Dept: OBSTETRICS AND GYNECOLOGY | Facility: CLINIC | Age: 25
End: 2025-08-07
Payer: COMMERCIAL

## 2025-08-07 VITALS
HEIGHT: 64 IN | BODY MASS INDEX: 25.52 KG/M2 | SYSTOLIC BLOOD PRESSURE: 98 MMHG | WEIGHT: 149.5 LBS | DIASTOLIC BLOOD PRESSURE: 68 MMHG

## 2025-08-07 DIAGNOSIS — Z90.710 STATUS POST LAPAROSCOPIC HYSTERECTOMY: ICD-10-CM

## 2025-08-07 DIAGNOSIS — Z09 POSTOPERATIVE EXAMINATION: Primary | ICD-10-CM

## 2025-08-07 PROCEDURE — 3074F SYST BP LT 130 MM HG: CPT | Mod: CPTII,S$GLB,, | Performed by: OBSTETRICS & GYNECOLOGY

## 2025-08-07 PROCEDURE — 1159F MED LIST DOCD IN RCRD: CPT | Mod: CPTII,S$GLB,, | Performed by: OBSTETRICS & GYNECOLOGY

## 2025-08-07 PROCEDURE — 99024 POSTOP FOLLOW-UP VISIT: CPT | Mod: S$GLB,,, | Performed by: OBSTETRICS & GYNECOLOGY

## 2025-08-07 PROCEDURE — 3078F DIAST BP <80 MM HG: CPT | Mod: CPTII,S$GLB,, | Performed by: OBSTETRICS & GYNECOLOGY

## 2025-08-07 PROCEDURE — 3044F HG A1C LEVEL LT 7.0%: CPT | Mod: CPTII,S$GLB,, | Performed by: OBSTETRICS & GYNECOLOGY

## 2025-08-07 PROCEDURE — 99999 PR PBB SHADOW E&M-EST. PATIENT-LVL III: CPT | Mod: PBBFAC,,, | Performed by: OBSTETRICS & GYNECOLOGY

## 2025-08-07 NOTE — PROGRESS NOTES
"Chief Complaint   Patient presents with    Post-op Evaluation       History of Present Illness:  Pateint presents today 6 weeks postop, status post robotic assisted laparoscopic hysterectomy, bilateral salpingectomy, cystoscopy     She is doing well this morning. She is tolerating a regular diet without nausea or vomiting. She is voiding spontaneously. She is ambulating. She has passed flatus, and has a BM. Vaginal bleeding is none. She denies fever or chills. Abdominal pain is mild and controlled with oral medications.     Surgery:   XI ROBOTIC HYSTERECTOMY,WITH SALPINGECTOMY (Bilateral)  CYSTOSCOPY (N/A)  Ovarian preservation    Operative Findings:   Grossly normal cervix, uterus, tubes, ovaries enlarged with multiple follicles,   Several small 3mm dark lesion on peritoneal surface, two on bladder reflection, two in post cul de sac, suspected endometriosis, all fulgurated, stage I.      Pathology:   UTERUS 89g, CERVIX, AND FALLOPIAN TUBES; HYSTERECTOMY AND BILATERAL    SALPINGECTOMY:      MYOMETRIUM:  --NO SIGNIFICANT HISTOPATHOLOGIC ABNORMALITY.    ENDOMETRIUM:  --PROLIFERATIVE-PHASE ENDOMETRIUM.      CERVIX:  --FOCAL CHRONIC CERVICITIS.  --FOCAL SQUAMOUS METAPLASIA OF ENDOCERVIX.      LEFT FALLOPIAN TUBE: --FOCAL SEROSAL FIBROVASCULAR ADHESIONS.      RIGHT FALLOPIAN TUBE: --NO SIGNIFICANT HISTOPATHOLOGIC ABNORMALITY     Physical exam:  Vital Signs:   Vitals:    08/07/25 1118   BP: 98/68   Weight: 67.8 kg (149 lb 7.6 oz)   Height: 5' 4" (1.626 m)     Abdominal: Soft. She exhibits no distension and no mass.  The incisions are healing well. There is no rebound and no guarding.   Genitourinary:   Vaginal cuff intact, well as a healed, no bleeding    Assessment/Plan:   Postoperative examination    Status post laparoscopic hysterectomy        Follow up Annual      "